# Patient Record
(demographics unavailable — no encounter records)

---

## 2025-01-26 NOTE — HISTORY OF PRESENT ILLNESS
[FreeTextEntry1] : 4 month f/u [de-identified] : Ms. BUSH is a 65 year old F with PMHx of HTN, Depression/Anxiety, Current Smoker, COPD, presenting for 4 month f/u. Pt reports she has stopped smoking for 4 weeks. Pt was using vapes with nicotine and has transitioned to vapes without nicotine. Pt currently has h pylori and is taking treatment per GI. Denies chest pain, sob, palumbo, dizziness, diaphoresis, palpitations, LE swelling, orthopnea, syncope, n/v, headache.  SAys she has buttock cyst that gets inflamed from time to time anad then goes away on its own, currently no pain, redness, drainage fever, chills.

## 2025-01-26 NOTE — PHYSICAL EXAM
[No Acute Distress] : no acute distress [Well Nourished] : well nourished [Well Developed] : well developed [Well-Appearing] : well-appearing [Normal Sclera/Conjunctiva] : normal sclera/conjunctiva [PERRL] : pupils equal round and reactive to light [EOMI] : extraocular movements intact [Normal Outer Ear/Nose] : the outer ears and nose were normal in appearance [Normal Oropharynx] : the oropharynx was normal [No JVD] : no jugular venous distention [No Lymphadenopathy] : no lymphadenopathy [Supple] : supple [Thyroid Normal, No Nodules] : the thyroid was normal and there were no nodules present [No Respiratory Distress] : no respiratory distress  [No Accessory Muscle Use] : no accessory muscle use [Clear to Auscultation] : lungs were clear to auscultation bilaterally [Normal Rate] : normal rate  [Regular Rhythm] : with a regular rhythm [Normal S1, S2] : normal S1 and S2 [No Murmur] : no murmur heard [No Carotid Bruits] : no carotid bruits [No Varicosities] : no varicosities [Pedal Pulses Present] : the pedal pulses are present [No Edema] : there was no peripheral edema [No Extremity Clubbing/Cyanosis] : no extremity clubbing/cyanosis [Soft] : abdomen soft [Non Tender] : non-tender [Non-distended] : non-distended [Normal Bowel Sounds] : normal bowel sounds [Normal Posterior Cervical Nodes] : no posterior cervical lymphadenopathy [Normal Anterior Cervical Nodes] : no anterior cervical lymphadenopathy [No CVA Tenderness] : no CVA  tenderness [No Spinal Tenderness] : no spinal tenderness [No Joint Swelling] : no joint swelling [Grossly Normal Strength/Tone] : grossly normal strength/tone [No Rash] : no rash [Coordination Grossly Intact] : coordination grossly intact [No Focal Deficits] : no focal deficits [Normal Gait] : normal gait [Normal Affect] : the affect was normal [Normal Insight/Judgement] : insight and judgment were intact [de-identified] : noninfected skin abrasions/cyst on upper buttocks

## 2025-01-26 NOTE — HEALTH RISK ASSESSMENT
[0] : 2) Feeling down, depressed, or hopeless: Not at all (0) [PHQ-2 Negative - No further assessment needed] : PHQ-2 Negative - No further assessment needed [Former] : Former [[id number]] : 0

## 2025-01-26 NOTE — ADDENDUM
[FreeTextEntry1] : This note was written by Jennifer Shook on 01/24/2025 acting as medical scribe for Dr. Simone Chadwick. I, Dr. Simone Chadwick, have read and attest that all the information, medical decision making and discharge instructions within are true and accurate.

## 2025-01-26 NOTE — HISTORY OF PRESENT ILLNESS
[FreeTextEntry1] : 4 month f/u [de-identified] : Ms. BUSH is a 65 year old F with PMHx of HTN, Depression/Anxiety, Current Smoker, COPD, presenting for 4 month f/u. Pt reports she has stopped smoking for 4 weeks. Pt was using vapes with nicotine and has transitioned to vapes without nicotine. Pt currently has h pylori and is taking treatment per GI. Denies chest pain, sob, palumbo, dizziness, diaphoresis, palpitations, LE swelling, orthopnea, syncope, n/v, headache.  SAys she has buttock cyst that gets inflamed from time to time anad then goes away on its own, currently no pain, redness, drainage fever, chills.

## 2025-01-26 NOTE — HEALTH RISK ASSESSMENT
[0] : 2) Feeling down, depressed, or hopeless: Not at all (0) [PHQ-2 Negative - No further assessment needed] : PHQ-2 Negative - No further assessment needed [Former] : Former [[id number]] : 0

## 2025-01-26 NOTE — PHYSICAL EXAM
[No Acute Distress] : no acute distress [Well Nourished] : well nourished [Well Developed] : well developed [Well-Appearing] : well-appearing [Normal Sclera/Conjunctiva] : normal sclera/conjunctiva [PERRL] : pupils equal round and reactive to light [EOMI] : extraocular movements intact [Normal Outer Ear/Nose] : the outer ears and nose were normal in appearance [Normal Oropharynx] : the oropharynx was normal [No JVD] : no jugular venous distention [No Lymphadenopathy] : no lymphadenopathy [Supple] : supple [Thyroid Normal, No Nodules] : the thyroid was normal and there were no nodules present [No Respiratory Distress] : no respiratory distress  [No Accessory Muscle Use] : no accessory muscle use [Clear to Auscultation] : lungs were clear to auscultation bilaterally [Normal Rate] : normal rate  [Regular Rhythm] : with a regular rhythm [Normal S1, S2] : normal S1 and S2 [No Murmur] : no murmur heard [No Carotid Bruits] : no carotid bruits [No Varicosities] : no varicosities [Pedal Pulses Present] : the pedal pulses are present [No Edema] : there was no peripheral edema [No Extremity Clubbing/Cyanosis] : no extremity clubbing/cyanosis [Soft] : abdomen soft [Non Tender] : non-tender [Non-distended] : non-distended [Normal Bowel Sounds] : normal bowel sounds [Normal Posterior Cervical Nodes] : no posterior cervical lymphadenopathy [Normal Anterior Cervical Nodes] : no anterior cervical lymphadenopathy [No CVA Tenderness] : no CVA  tenderness [No Spinal Tenderness] : no spinal tenderness [No Joint Swelling] : no joint swelling [Grossly Normal Strength/Tone] : grossly normal strength/tone [No Rash] : no rash [Coordination Grossly Intact] : coordination grossly intact [No Focal Deficits] : no focal deficits [Normal Gait] : normal gait [Normal Affect] : the affect was normal [Normal Insight/Judgement] : insight and judgment were intact [de-identified] : noninfected skin abrasions/cyst on upper buttocks

## 2025-02-05 NOTE — ADDENDUM
[FreeTextEntry1] : Documented by Orion Cruz acting as a scribe for Dr. Guy Posada on 02/05/2025. All medical record entries made by the Scribe were at my, Dr. Guy Posada's, direction and personally dictated by me on 02/05/2025. I have reviewed the chart and agree that the record accurately reflects my personal performance of the history, physical exam, assessment and plan. I have also personally directed, reviewed, and agree with the discharge instructions.

## 2025-02-05 NOTE — HISTORY OF PRESENT ILLNESS
[TextBox_4] : Ms. BUSH is a 65-year-old female with a history of HTN, osteoarthritis, anxiety, depression, low Vitamin D, nicotine addiction, who now comes in for a follow-up pulmonary evaluation. Her chief complaint is  -she notes stopping smoking  -she notes energy levels are poor at times -she notes believing her weight is bringing her energy down  -she notes poor quality of sleep -she notes taking naps during the day  -she notes feeling depressed at night  -she notes food causes her to be tired  -she notes her breathing is stable -she notes vision is stable -she notes sinuses are congested  -she notes going for a colonoscopy and endoscopy  -she notes sweats at night  -she denies exercising -she notes using Ozempic in the summer   -she denies any headaches, nausea, emesis, fever, chills, sweats, chest pain, chest pressure, coughing, wheezing, palpitations, diarrhea, constipation, dysphagia, vertigo, arthralgias, myalgias, leg swelling, itchy eyes, itchy ears, heartburn, reflux, or sour taste in the mouth.

## 2025-02-05 NOTE — PROCEDURE
[FreeTextEntry1] : Full PFT reveals mild restrictive dysfunction; FEV1 was  1.50L which is 69% of predicted; normal lung volumes; mildly reduced diffusion at 2.93, which is 66% of predicted; normal flow volume loop. PFTs were performed to evaluate for SOB  FENO was 19; a normal value being less than 25 Fractional exhaled nitric oxide (FENO) is regarded as a simple, noninvasive method for assessing eosinophilic airway inflammation. Produced by a variety of cells within the lung, nitric oxide (NO) concentrations are generally low in healthy individuals. However, high concentrations of NO appear to be involved in nonspecific host defense mechanisms and chronic inflammatory diseases such as asthma. The American Thoracic Society (ATS) therefore has recommended using FENO to aid in the diagnosis and monitoring of eosinophilic airway inflammation and asthma, and for identifying steroid responsive individuals whose chronic respiratory symptoms may be caused by airway inflammation.   The American Thoracic Society (ATS) strongly recommends the use of FeNO measurement to aid in the assessment, management, and long-term monitoring of asthma. In their 2011 clinical practice guideline, the ATS emphasizes the importance of using FeNO.

## 2025-02-05 NOTE — REASON FOR VISIT
[Follow-Up] : a follow-up visit [TextBox_44] : SOB, COPD/emphysema, nicotine addiction, muscle cramps, RLS, abnormal chest CT

## 2025-02-05 NOTE — ASSESSMENT
[FreeTextEntry1] : Ms. BUSH is a 65-year-old female with a history of HTN, osteoarthritis, anxiety, depression, low Vitamin D, nicotine addiction- SOB, COPD/emphysema, nicotine addiction, muscle cramps, RLS, abnormal chest CT - improved c/w weight loss, smoking (still)- s/p URI, LPR/SOB - resolved- s/p nicotine addiction #1 sleep/weight   The patient's SOB is felt to be multifactorial: -poor mechanics of breathing -out of shape/overweight -Pulmonary  -emphysema  -COPD -Cardiac  Problem 1: Emphysema / COPD - stable -continue Breztri 2 puffs BID -continue Albuterol (.83) via nebulizer, up to Q6H -s/p Prednisone 20 mg x 7 days, 10 mg x 7 days -s/p Alpha-1 Antitrypsin levels- WNL -Inhaler technique reviewed as well as oral hygiene technique reviewed with patient. Avoidance of cold air, extremes of temperature, rescue inhaler should be used before exercise. Order of medication reviewed with patient. Recommended use of a cool mist humidifier in the bedroom. -COPD is a progressive disease and although it cant be cured, appropriate management can slow its progression, reduce frequency and severity of exacerbations, and improve symptoms and the patient quality of life. Hospitalizations are the greatest contributor to the total COPD costs and account for up to 87% of total COPD related costs. Exacerbations are the main cause of admissions and subsequently account for the 40%-75% of COPD costs. Inhaled maintenance therapy reduces the incidence of exacerbations in patients with stable CPPD. Incorrect inhaler use and nonadherence are major obstacles to achieving COPD treatments goals. Many COPD patients have challenges (impaired inhalation, limited dexterity, reduced cognition: that limit their ability to correctly use their COPD treatment devices resulting in reduced symptom control. Of most importance is smoking cessation and early intervention with respiratory illnesses and contemplation for pulmonary rehab to enhance quality of life.  Problem 2: Abnormal CT of the Chest -Emphysema, no nodules -complete follow-up CT 8/2025 -CAT scans are the only radiological modality to identify abnormality to identify abnormalities w/in the lings with regards to nodules/masses/lymph nodes. Risks, benefits were reviewed in detail. The guidelines for abnormalities include follow up CT scans at various intervals which could range from 6 weeks to 1 year intervals. If there is a change for the worse then considerations for a biopsy will be considered if you are a candidate. Second opinion evaluation with thoracic surgeon or an interventional radiologist could be offered  Problem 3: Nicotine Addiction (resolved)  -Discussed for five minutes with the patient the risks/associations with continued smoking including COPD, emphysema, shortness of breath, renal cancer, bladder cancer, stroke risk, cardiac disease, etc. Smoking cessation was discussed at length and highly encouraged. Various options to aid cessation was discussed including use of Chantix, Nicotrol, nicotine products, laser therapy, hypnosis, Wellbutrin, etc  Problem 3A: LPR/GERD -continue Omeprazole 40 mg QAM, pre-meal -Rule of 2s: avoid eating too much, eating too late, eating too spicy, eating two hours before bed. -Things to avoid including overeating, spicy foods, tight clothing, eating within three hours of bed, this list is not all inclusive. -For treatment of reflux, possible options discussed including diet control, H2 blockers, PPIs, as well as coating motility agents discussed as treatment options. Timing of meals and proximity of last meal to sleep were discussed. If symptoms persist, a formal gastrointestinal evaluation is needed.  Problem 4: Muscle Cramps -Recommend Myocalm PM for nocturnal muscle cramps  Problem 5: RLS (normal iron studies) -Trial of Myocalm -If Myocalm does not work, add Mirapex 0.5 mg before bed -Restless Legs Syndrome (RLS), also known as Jhaveri-Ekbom Disease, is a common sleep-related movement disorder. About 1 in 10 adults in the [place] have problems from restless leg syndrome. It also can be seen in about 2% of children. Women are twice as likely as men to have RLS. People with RLS will have symptoms most often during times when they are less active, especially at bedtime. RLS most often causes an overwhelming urge to move your legs and sometimes other parts of the body. The symptoms can be mild to severe and can affect your ability to go to sleep and stay asleep. People with RLS often sleep less at night and feel more tired during the day.  Problem 6: ?YONI ([place]) -Complete home sleep study (pending) -Sleep apnea is associated with adverse clinical consequences which can affect most organ systems. Cardiovascular disease risk includes arrhythmias, atrial fibrillation, hypertension, coronary artery disease, and stroke. Metabolic disorders include diabetes type 2, non-alcoholic fatty liver disease. Mood disorder especially depression; and cognitive decline especially in the elderly. Associations with chronic reflux/Duarte's esophagus some but not all inclusive.  Problem 7: Cardiac -Recommend cardiac follow up evaluation with cardiologist if needed ([place])  Problem 8: overweight/out of shape -zepbound is YONI present  -recommended Berberine OTC for visceral fat loss -Recommend "Muniq" OTC for weight loss, energy, and blood sugar - Facundo sarabia 10 days Detox Diet - Weight loss, exercise and diet control were discussed and are highly encouraged. Treatment options were given such as aqua therapy, and contacting a nutritionist. Recommended to use the elliptical, stationary bike, less use of treadmill. Mindful eating was explained to the patient. Obesity is associated with worsening asthma, SOB, and potential for cardiac disease, diabetes, and other underlying medical conditions.  Problem 9: Poor mechanics of breathing -Recommended Widionte Yanes and [place] breathing techniques - Proper breathing techniques were reviewed with an emphasis on exhalation. Patient instructed to breath in for 1 second and out for four seconds. Patient was encouraged not to talk while walking.  Problem 10: Health Maintenance -s/p COVID-19 vaccine x3 -s/p flu shot 2024 -recommended strep pneumonia vaccines: Prevnar-13 vaccine, follow by Pneumo vaccine 23 one year following -recommended early intervention for URIs -recommended regular osteoporosis evaluations -recommended early dermatological evaluations -recommended after the age of 50 to the age of 70, colonoscopy every 5 years  F/P in 6 months  pt is encouraged to call or fax the office with any questions or concerns.

## 2025-02-06 NOTE — HISTORY OF PRESENT ILLNESS
[FreeTextEntry1] : She is a 65-year-old woman who is seen today for initial visit.  She usually does not have urological complaints.  There is no flank pain.  She has no hematuria or dysuria.  Creatinine was 0.8, A1c was 5.5.  Ultrasound in May 2024 showed small right renal cysts and left renal cyst 2.7 cm, also seen on previous chest CT scan.

## 2025-02-06 NOTE — PHYSICAL EXAM
[General Appearance - Well Developed] : well developed [General Appearance - Well Nourished] : well nourished [Normal Appearance] : normal appearance [Well Groomed] : well groomed [] : no respiratory distress [Exaggerated Use Of Accessory Muscles For Inspiration] : no accessory muscle use [Normal Station and Gait] : the gait and station were normal for the patient's age [Oriented To Time, Place, And Person] : oriented to person, place, and time [Affect] : the affect was normal [Mood] : the mood was normal [Not Anxious] : not anxious

## 2025-02-06 NOTE — ASSESSMENT
[FreeTextEntry1] : We reviewed CT and ultrasound images on the computer screen.  Simple renal cysts are sacs filled with fluid. The exact cause of renal cysts is unknown but cysts are more common with advancing age. Up to one third of patients over age 70 have renal cysts. Having many renal cysts is different than polycystic renal disease. Simple renal cysts are usually found incidentally with imaging studies (US, CT scan or MRI), are usually asymptomatic and do not require any treatment. Very large renal cysts can cause dull pain or discomfort. Cysts can rarely become infected, develop bleeding, rupture or impair renal function. Surgical removal or drainage and sclerotherapy of renal cysts can be performed in specific clinical settings. Bosniak classification of renal cysts into 5 categories was reviewed: simple (category I), minimally complex (category II and IIF), indeterminate (category III) and solid (category IV). Malignancy risk, treatment and follow-up of renal cysts based on category were discussed. Patient's questions were answered.  No further intervention or follow-up is needed for her renal cysts at this time.    Clement Pratt MD, FACS [facility]  of Urology  [address] [place] [address], [address] [place] [id number]  Tel: [phone] Fax: [phone]

## 2025-03-26 NOTE — ADDENDUM
[FreeTextEntry1] : This note was written by Jennifer Shook on 03/26/2025 acting as medical scribe for Dr. Simone Chadwick. I, Dr. Simone Chadwick, have read and attest that all the information, medical decision making and discharge instructions within are true and accurate.

## 2025-03-26 NOTE — HEALTH RISK ASSESSMENT
[0] : 2) Feeling down, depressed, or hopeless: Not at all (0) [PHQ-2 Negative - No further assessment needed] : PHQ-2 Negative - No further assessment needed [[id number]] : 0 [Current] : Current

## 2025-03-26 NOTE — HISTORY OF PRESENT ILLNESS
[FreeTextEntry1] : 2 month f/u [de-identified] : Ms. BUSH is a 65 year old F with PMHx of HTN, Depression/Anxiety, Current Smoker, COPD, presenting for 2 month f/u.  Denies chest pain, sob, palumbo, dizziness, diaphoresis, palpitations, LE swelling, orthopnea, syncope, n/v, headache.

## 2025-03-28 NOTE — ASSESSMENT
[FreeTextEntry1] : 8/2024: 66 y/o female presents today for EGD/colonoscopy consultation. Patient states that her last colonoscopy was due during the pandemic. Patient recalls 2 polyps from her last colonoscopy approximately 10 years ago. Denies family hx of colon cancer. Up to date on mammograms and pap smears. Currently taking omeprazole for acid reflux, with minimal relief. Hoarse voice noted. Denies sob, cp, abdominal pain or altered BMs.   RTO 3/28/25 for repeat UBT and lab work to rule out celiac and PBC. Patient states that GERD symptoms resolved during antibiotic therapy but returned recently. Denies sob, cp, abdominal pain or altered BMs. Reviewed recent lab work and EGD/colonoscopy results. Patient will resume omeprazole 40 mg daily. Will repeat UBT. Attempted to draw lab work-Patient refusing labs to be drawn in office due to poor vascularity. Will order labs to be drawn at outpatient lab. Will follow up results.   Plan: 1. Ordered UBT 2. Ordered lab work to outpatient lab 3. Resume omeprazole 40 mg daily  4. Follow up results.

## 2025-03-28 NOTE — PHYSICAL EXAM

## 2025-03-28 NOTE — HISTORY OF PRESENT ILLNESS
[de-identified] : 1/15/25: Acute gastritis, + h.pylori [FreeTextEntry1] : 1/15/25: Normal, repeat 10 years.

## 2025-04-23 NOTE — REVIEW OF SYSTEMS
Pulmonary:      Effort: Pulmonary effort is normal. No accessory muscle usage.      Breath sounds: Normal breath sounds and air entry. No decreased air movement or transmitted upper airway sounds. No decreased breath sounds.   Chest:      Chest wall: No tenderness.   Abdominal:      General: There is no distension.      Palpations: Abdomen is soft.      Tenderness: There is no abdominal tenderness.   Musculoskeletal:         General: No swelling. Deformity: of feet .     Cervical back: Neck supple.   Lymphadenopathy:      Cervical: No cervical adenopathy.   Skin:     General: Skin is warm and dry.      Findings: No rash.   Neurological:      General: No focal deficit present.      Mental Status: She is alert.      Gait: Gait normal.   Psychiatric:         Attention and Perception: Attention and perception normal.         Mood and Affect: Mood and affect normal.         Speech: Speech normal.         Behavior: Behavior normal. Behavior is cooperative.         Thought Content: Thought content normal.         Cognition and Memory: Cognition and memory normal.         ASSESSMENT/PLAN:    Ghada was seen today for annual exam.    Diagnoses and all orders for this visit:    Physical exam    Type 2 diabetes mellitus without complication, without long-term current use of insulin (HCC)  More prediabetic now with weight loss continue low carb diet    Essential hypertension controlled     Borderline high cholesterol ok profile     Hx of thyroid cyst benign appearing nodules in 2022 and per report no further follow up needed     Adenomatous polyp of colon, unspecified part of colon  -     EMILIE - Austin Suh MD, Gastroenterology (ERCP & EUS), [facility] [place]    Anxiety and depression  Wants to continue on zoloft    Encounter for screening mammogram for malignant neoplasm of breast  -     MATT DIGITAL SCREEN W OR WO CAD BILATERAL; Future        No orders of the defined types were placed in this encounter.       Return  [Negative] : Heme/Lymph

## 2025-04-25 NOTE — HISTORY OF PRESENT ILLNESS
[FreeTextEntry1] : 1-month f/u  [de-identified] : Ms. BUSH is a 65 year old F with PMHx of HTN, Depression/Anxiety, Current Smoker, COPD, presenting for 1-month f/u. Pt reports chest discomfort pulling sensation on left side 2 weeks worse when laying down, not pleuritic and not exertional. Hurts near left rib cage when moves left arm or certain movements. Denies fall or trauma to chest.  CTAC 6/2024: calcium score 0 and no CAD Denies SOB, CORDOVA, dizziness, diaphoresis, palpitations, LE swelling, orthopnea, syncope, n/v, headache.  Pt denies focal weakness, vision changes, numbness/tingling, dysphagia, dysarthria.

## 2025-04-25 NOTE — HEALTH RISK ASSESSMENT
[0] : 2) Feeling down, depressed, or hopeless: Not at all (0) [PHQ-2 Negative - No further assessment needed] : PHQ-2 Negative - No further assessment needed [Current] : Current [[id number]] : 0

## 2025-04-25 NOTE — HEALTH RISK ASSESSMENT
[0] : 2) Feeling down, depressed, or hopeless: Not at all (0) [PHQ-2 Negative - No further assessment needed] : PHQ-2 Negative - No further assessment needed [Current] : Current [[id number]] : 0

## 2025-04-25 NOTE — HISTORY OF PRESENT ILLNESS
[FreeTextEntry1] : 1-month f/u  [de-identified] : Ms. BUSH is a 65 year old F with PMHx of HTN, Depression/Anxiety, Current Smoker, COPD, presenting for 1-month f/u. Pt reports chest discomfort pulling sensation on left side 2 weeks worse when laying down, not pleuritic and not exertional. Hurts near left rib cage when moves left arm or certain movements. Denies fall or trauma to chest.  CTAC 6/2024: calcium score 0 and no CAD Denies SOB, CORDOVA, dizziness, diaphoresis, palpitations, LE swelling, orthopnea, syncope, n/v, headache.  Pt denies focal weakness, vision changes, numbness/tingling, dysphagia, dysarthria.

## 2025-04-25 NOTE — PHYSICAL EXAM
[No Acute Distress] : no acute distress [Well Nourished] : well nourished [Well Developed] : well developed [Well-Appearing] : well-appearing [Normal Sclera/Conjunctiva] : normal sclera/conjunctiva [PERRL] : pupils equal round and reactive to light [EOMI] : extraocular movements intact [Normal Outer Ear/Nose] : the outer ears and nose were normal in appearance [Normal Oropharynx] : the oropharynx was normal [No JVD] : no jugular venous distention [No Lymphadenopathy] : no lymphadenopathy [Supple] : supple [Thyroid Normal, No Nodules] : the thyroid was normal and there were no nodules present [No Respiratory Distress] : no respiratory distress  [No Accessory Muscle Use] : no accessory muscle use [Clear to Auscultation] : lungs were clear to auscultation bilaterally [Normal Rate] : normal rate  [Regular Rhythm] : with a regular rhythm [Normal S1, S2] : normal S1 and S2 [No Murmur] : no murmur heard [No Carotid Bruits] : no carotid bruits [No Varicosities] : no varicosities [Pedal Pulses Present] : the pedal pulses are present [No Edema] : there was no peripheral edema [No Extremity Clubbing/Cyanosis] : no extremity clubbing/cyanosis [Soft] : abdomen soft [Non Tender] : non-tender [Non-distended] : non-distended [Normal Bowel Sounds] : normal bowel sounds [Normal Posterior Cervical Nodes] : no posterior cervical lymphadenopathy [Normal Anterior Cervical Nodes] : no anterior cervical lymphadenopathy [No CVA Tenderness] : no CVA  tenderness [No Spinal Tenderness] : no spinal tenderness [No Joint Swelling] : no joint swelling [Grossly Normal Strength/Tone] : grossly normal strength/tone [No Rash] : no rash [Coordination Grossly Intact] : coordination grossly intact [No Focal Deficits] : no focal deficits [Normal Gait] : normal gait [Normal Affect] : the affect was normal [Normal Insight/Judgement] : insight and judgment were intact [Alert and Oriented x3] : oriented to person, place, and time [de-identified] : +mild left sided reproducible chest tenderness [de-identified] : left rib tenderness+

## 2025-04-25 NOTE — HEALTH RISK ASSESSMENT
[0] : 2) Feeling down, depressed, or hopeless: Not at all (0) [PHQ-2 Negative - No further assessment needed] : PHQ-2 Negative - No further assessment needed [Current] : Current [[id number]] : 0

## 2025-04-25 NOTE — PHYSICAL EXAM
[No Acute Distress] : no acute distress [Well Nourished] : well nourished [Well Developed] : well developed [Well-Appearing] : well-appearing [Normal Sclera/Conjunctiva] : normal sclera/conjunctiva [PERRL] : pupils equal round and reactive to light [EOMI] : extraocular movements intact [Normal Outer Ear/Nose] : the outer ears and nose were normal in appearance [Normal Oropharynx] : the oropharynx was normal [No JVD] : no jugular venous distention [No Lymphadenopathy] : no lymphadenopathy [Supple] : supple [Thyroid Normal, No Nodules] : the thyroid was normal and there were no nodules present [No Respiratory Distress] : no respiratory distress  [No Accessory Muscle Use] : no accessory muscle use [Clear to Auscultation] : lungs were clear to auscultation bilaterally [Normal Rate] : normal rate  [Regular Rhythm] : with a regular rhythm [Normal S1, S2] : normal S1 and S2 [No Murmur] : no murmur heard [No Carotid Bruits] : no carotid bruits [No Varicosities] : no varicosities [Pedal Pulses Present] : the pedal pulses are present [No Edema] : there was no peripheral edema [No Extremity Clubbing/Cyanosis] : no extremity clubbing/cyanosis [Soft] : abdomen soft [Non Tender] : non-tender [Non-distended] : non-distended [Normal Bowel Sounds] : normal bowel sounds [Normal Posterior Cervical Nodes] : no posterior cervical lymphadenopathy [Normal Anterior Cervical Nodes] : no anterior cervical lymphadenopathy [No CVA Tenderness] : no CVA  tenderness [No Spinal Tenderness] : no spinal tenderness [No Joint Swelling] : no joint swelling [Grossly Normal Strength/Tone] : grossly normal strength/tone [No Rash] : no rash [Coordination Grossly Intact] : coordination grossly intact [No Focal Deficits] : no focal deficits [Normal Gait] : normal gait [Normal Affect] : the affect was normal [Normal Insight/Judgement] : insight and judgment were intact [Alert and Oriented x3] : oriented to person, place, and time [de-identified] : +mild left sided reproducible chest tenderness [de-identified] : left rib tenderness+

## 2025-04-25 NOTE — HISTORY OF PRESENT ILLNESS
[FreeTextEntry1] : 1-month f/u  [de-identified] : Ms. BUSH is a 65 year old F with PMHx of HTN, Depression/Anxiety, Current Smoker, COPD, presenting for 1-month f/u. Pt reports chest discomfort pulling sensation on left side 2 weeks worse when laying down, not pleuritic and not exertional. Hurts near left rib cage when moves left arm or certain movements. Denies fall or trauma to chest.  CTAC 6/2024: calcium score 0 and no CAD Denies SOB, CORDOVA, dizziness, diaphoresis, palpitations, LE swelling, orthopnea, syncope, n/v, headache.  Pt denies focal weakness, vision changes, numbness/tingling, dysphagia, dysarthria.

## 2025-04-25 NOTE — ADDENDUM
[FreeTextEntry1] : This note was written by Hannah Thorne on 04/24/2025 acting as medical scribe for Dr. Simone Chadwick. I, Dr. Simone Chadwick, have read and attest that all the information, medical decision making and discharge instructions within are true and accurate.

## 2025-04-25 NOTE — PHYSICAL EXAM
[No Acute Distress] : no acute distress [Well Nourished] : well nourished [Well Developed] : well developed [Well-Appearing] : well-appearing [Normal Sclera/Conjunctiva] : normal sclera/conjunctiva [PERRL] : pupils equal round and reactive to light [EOMI] : extraocular movements intact [Normal Outer Ear/Nose] : the outer ears and nose were normal in appearance [Normal Oropharynx] : the oropharynx was normal [No JVD] : no jugular venous distention [No Lymphadenopathy] : no lymphadenopathy [Supple] : supple [Thyroid Normal, No Nodules] : the thyroid was normal and there were no nodules present [No Respiratory Distress] : no respiratory distress  [No Accessory Muscle Use] : no accessory muscle use [Clear to Auscultation] : lungs were clear to auscultation bilaterally [Normal Rate] : normal rate  [Regular Rhythm] : with a regular rhythm [Normal S1, S2] : normal S1 and S2 [No Murmur] : no murmur heard [No Carotid Bruits] : no carotid bruits [No Varicosities] : no varicosities [Pedal Pulses Present] : the pedal pulses are present [No Edema] : there was no peripheral edema [No Extremity Clubbing/Cyanosis] : no extremity clubbing/cyanosis [Soft] : abdomen soft [Non Tender] : non-tender [Non-distended] : non-distended [Normal Bowel Sounds] : normal bowel sounds [Normal Posterior Cervical Nodes] : no posterior cervical lymphadenopathy [Normal Anterior Cervical Nodes] : no anterior cervical lymphadenopathy [No CVA Tenderness] : no CVA  tenderness [No Spinal Tenderness] : no spinal tenderness [No Joint Swelling] : no joint swelling [Grossly Normal Strength/Tone] : grossly normal strength/tone [No Rash] : no rash [Coordination Grossly Intact] : coordination grossly intact [No Focal Deficits] : no focal deficits [Normal Gait] : normal gait [Normal Affect] : the affect was normal [Normal Insight/Judgement] : insight and judgment were intact [Alert and Oriented x3] : oriented to person, place, and time [de-identified] : +mild left sided reproducible chest tenderness [de-identified] : left rib tenderness+

## 2025-04-25 NOTE — ADDENDUM
PCP signed and faxed to [facility] [phone], 12/12/24 at 9:47am.    [FreeTextEntry1] : This note was written by Hannah Thorne on 04/24/2025 acting as medical scribe for Dr. Simone Chadwick. I, Dr. Simone Chadwick, have read and attest that all the information, medical decision making and discharge instructions within are true and accurate.

## 2025-06-29 NOTE — HEALTH RISK ASSESSMENT
[0] : 2) Feeling down, depressed, or hopeless: Not at all (0) [PHQ-2 Negative - No further assessment needed] : PHQ-2 Negative - No further assessment needed [Current] : Current [[id number]] : 0

## 2025-06-29 NOTE — HISTORY OF PRESENT ILLNESS
[FreeTextEntry1] : 2-month f/u [de-identified] : Ms. BUSH is a 65-year-old female with PMHx of HTN, Depression/Anxiety, Current Smoker, COPD presenting today for a 2-month follow up. Pt states she has sob, anxiety attacks that started a few days ago. Last CK level was elevated. Pt saw pulm Dr. Posada and was diagnosed with sleep apnea who recommends that patient tries injection for weight loss. She was considering [id number], but having a second thought considering her symptoms and is requesting a letter to cancel the surgery scheduled surgery.  Denies chest pain, dizziness, diaphoresis, palpitations, LE swelling, orthopnea, syncope, n/v, headache.  ASking for GLP 1

## 2025-06-29 NOTE — ADDENDUM
[FreeTextEntry1] : This note was written by Dayne Solis on 06/25/2025 acting as medical scribe for Dr. Simone Chadwick. I, Dr. Simone Chadwick, have read and attest that all the information, medical decision making and discharge instructions within are true and accurate.

## 2025-07-23 NOTE — HISTORY OF PRESENT ILLNESS
[FreeTextEntry1] : follow up  [de-identified] : Ms. BUSH is a 65 year-old female with PMHx of presenting today for a follow up. Pt came in to get her medications refilled. Pt also started Zepbound 2.5mg lost 5 pounds in past 1 month is tolerating well, will go up to 5mg.  Denies chest pain, sob, palumbo, dizziness, diaphoresis, palpitations, LE swelling, orthopnea, syncope, n/v, headache.

## 2025-07-23 NOTE — ADDENDUM
[FreeTextEntry1] :  This note was written by Vicki Marlow on Jul 23 2025 11:00AM acting as medical scribe for Dr. Simone Chadwick.I, Dr. Simone Chadwick, have read and attest that all the information, medical decision making and discharge instructions within are true and accurate.

## 2025-07-23 NOTE — HEALTH RISK ASSESSMENT
[0] : 2) Feeling down, depressed, or hopeless: Not at all (0) [PHQ-2 Negative - No further assessment needed] : PHQ-2 Negative - No further assessment needed [Current] : Current [[id number]] : 0

## 2025-07-23 NOTE — HISTORY OF PRESENT ILLNESS
[FreeTextEntry1] : follow up  [de-identified] : Ms. BUSH is a 65 year-old female with PMHx of presenting today for a follow up. Pt came in to get her medications refilled. Pt also started Zepbound 2.5mg lost 5 pounds in past 1 month is tolerating well, will go up to 5mg.  Denies chest pain, sob, palumbo, dizziness, diaphoresis, palpitations, LE swelling, orthopnea, syncope, n/v, headache.